# Patient Record
Sex: FEMALE | Race: OTHER | NOT HISPANIC OR LATINO | URBAN - METROPOLITAN AREA
[De-identification: names, ages, dates, MRNs, and addresses within clinical notes are randomized per-mention and may not be internally consistent; named-entity substitution may affect disease eponyms.]

---

## 2019-02-13 PROBLEM — Z00.00 ENCOUNTER FOR PREVENTIVE HEALTH EXAMINATION: Status: ACTIVE | Noted: 2019-02-13

## 2019-02-14 ENCOUNTER — OUTPATIENT (OUTPATIENT)
Dept: OUTPATIENT SERVICES | Facility: HOSPITAL | Age: 44
LOS: 1 days | End: 2019-02-14
Payer: COMMERCIAL

## 2019-02-14 ENCOUNTER — APPOINTMENT (OUTPATIENT)
Dept: CT IMAGING | Facility: IMAGING CENTER | Age: 44
End: 2019-02-14
Payer: COMMERCIAL

## 2019-02-14 DIAGNOSIS — Z00.8 ENCOUNTER FOR OTHER GENERAL EXAMINATION: ICD-10-CM

## 2019-02-14 PROCEDURE — 74174 CTA ABD&PLVS W/CONTRAST: CPT

## 2019-02-14 PROCEDURE — 74174 CTA ABD&PLVS W/CONTRAST: CPT | Mod: 26

## 2019-02-21 ENCOUNTER — OUTPATIENT (OUTPATIENT)
Dept: OUTPATIENT SERVICES | Facility: HOSPITAL | Age: 44
LOS: 1 days | End: 2019-02-21
Payer: COMMERCIAL

## 2019-02-21 ENCOUNTER — RESULT REVIEW (OUTPATIENT)
Age: 44
End: 2019-02-21

## 2019-02-21 DIAGNOSIS — Z90.13 ACQUIRED ABSENCE OF BILATERAL BREASTS AND NIPPLES: ICD-10-CM

## 2019-02-21 DIAGNOSIS — C50.212 MALIGNANT NEOPLASM OF UPPER-INNER QUADRANT OF LEFT FEMALE BREAST: ICD-10-CM

## 2019-02-21 PROCEDURE — 88321 CONSLTJ&REPRT SLD PREP ELSWR: CPT

## 2019-02-22 LAB — SURGICAL PATHOLOGY STUDY: SIGNIFICANT CHANGE UP

## 2019-02-28 ENCOUNTER — OUTPATIENT (OUTPATIENT)
Dept: OUTPATIENT SERVICES | Facility: HOSPITAL | Age: 44
LOS: 1 days | End: 2019-02-28
Payer: COMMERCIAL

## 2019-02-28 VITALS
RESPIRATION RATE: 18 BRPM | HEIGHT: 62.5 IN | TEMPERATURE: 99 F | SYSTOLIC BLOOD PRESSURE: 118 MMHG | WEIGHT: 133.16 LBS | HEART RATE: 94 BPM | OXYGEN SATURATION: 99 % | DIASTOLIC BLOOD PRESSURE: 78 MMHG

## 2019-02-28 VITALS — HEIGHT: 62.5 IN | WEIGHT: 133.16 LBS

## 2019-02-28 DIAGNOSIS — Z01.818 ENCOUNTER FOR OTHER PREPROCEDURAL EXAMINATION: ICD-10-CM

## 2019-02-28 DIAGNOSIS — Z90.13 ACQUIRED ABSENCE OF BILATERAL BREASTS AND NIPPLES: ICD-10-CM

## 2019-02-28 DIAGNOSIS — C50.912 MALIGNANT NEOPLASM OF UNSPECIFIED SITE OF LEFT FEMALE BREAST: ICD-10-CM

## 2019-02-28 DIAGNOSIS — C50.212 MALIGNANT NEOPLASM OF UPPER-INNER QUADRANT OF LEFT FEMALE BREAST: ICD-10-CM

## 2019-02-28 LAB
BLD GP AB SCN SERPL QL: SIGNIFICANT CHANGE UP
HCT VFR BLD CALC: 35.1 % — SIGNIFICANT CHANGE UP (ref 34.5–45)
HGB BLD-MCNC: 10.6 G/DL — LOW (ref 11.5–15.5)
MCHC RBC-ENTMCNC: 19.2 PG — LOW (ref 27–34)
MCHC RBC-ENTMCNC: 30.2 GM/DL — LOW (ref 32–36)
MCV RBC AUTO: 63.5 FL — LOW (ref 80–100)
PLATELET # BLD AUTO: 324 K/UL — SIGNIFICANT CHANGE UP (ref 150–400)
RBC # BLD: 5.53 M/UL — HIGH (ref 3.8–5.2)
RBC # FLD: 13.1 % — SIGNIFICANT CHANGE UP (ref 10.3–14.5)
WBC # BLD: 10.4 K/UL — SIGNIFICANT CHANGE UP (ref 3.8–10.5)
WBC # FLD AUTO: 10.4 K/UL — SIGNIFICANT CHANGE UP (ref 3.8–10.5)

## 2019-02-28 PROCEDURE — 86900 BLOOD TYPING SEROLOGIC ABO: CPT

## 2019-02-28 PROCEDURE — 86850 RBC ANTIBODY SCREEN: CPT

## 2019-02-28 PROCEDURE — 86901 BLOOD TYPING SEROLOGIC RH(D): CPT

## 2019-02-28 PROCEDURE — 85027 COMPLETE CBC AUTOMATED: CPT

## 2019-02-28 PROCEDURE — 36415 COLL VENOUS BLD VENIPUNCTURE: CPT

## 2019-02-28 PROCEDURE — G0463: CPT

## 2019-02-28 NOTE — H&P PST ADULT - NSANTHOSAYNRD_GEN_A_CORE
No. DAIANA screening performed.  STOP BANG Legend: 0-2 = LOW Risk; 3-4 = INTERMEDIATE Risk; 5-8 = HIGH Risk

## 2019-02-28 NOTE — H&P PST ADULT - ATTENDING COMMENTS
The patient is a 44 year old female who was recently diagnosed with left 11:00 invasive ductal breast cancer.  She presents to undergo bilateral nipple areolar sparing total mastectomies, bilateral axillary sentinel lymph node biopsies, possible bilateral axillary lymph node dissections and reconstruction.

## 2019-02-28 NOTE — H&P PST ADULT - PROBLEM SELECTOR PLAN 1
Bilateral Total Mastectomy, Left Axillary [place] Lymph Node Biopsy, Possible Left Axillary Lymph Node Dissection, Bilateral Christine Flap scheduled for 3/14/2019.   Pre-op instructions given. Pt verbalized understanding  Pepcid given for GI prophylaxis  Chlorhexidine wash instructions given  UCG ordered STAT for day of procedure   Pending: Medical clearance - requested by surgeon

## 2019-02-28 NOTE — H&P PST ADULT - HISTORY OF PRESENT ILLNESS
43yo female denies significant medical history reports annual mammogram done 1/2019 showed left breast abnormality and pt reports Breast US and biospy confirmed left breast acnae 43yo female denies significant medical history reports annual mammogram done 1/2019 showed left breast abnormality. Pt reports Breast US and biopsy confirmed left breast cancer. Pt presents today for presurgical testing for Bilateral Total Mastectomy, Left Axillary [place] Lymph Node Biopsy, Possible Left Axillary Lymph Node Dissection, Bilateral Christine Flap scheduled for 3/14/2019.

## 2019-02-28 NOTE — H&P PST ADULT - FAMILY HISTORY
Grandparent  Still living? Unknown  Family history of breast cancer in first degree relative, Age at diagnosis: Age Unknown     Father  Still living? Yes, Estimated age: Age Unknown  Family history of prostate cancer in father, Age at diagnosis: Age Unknown     Sibling  Still living? No  Family history of cancer in sister, Age at diagnosis: Age Unknown

## 2019-02-28 NOTE — H&P PST ADULT - MUSCULOSKELETAL
negative detailed exam normal strength/no joint swelling/no joint erythema/ROM intact/no joint warmth/no calf tenderness

## 2019-02-28 NOTE — H&P PST ADULT - NEGATIVE BREAST SYMPTOMS
no breast tenderness R/no nipple discharge L/no breast tenderness L/no nipple discharge R/no breast lump R

## 2019-03-13 ENCOUNTER — TRANSCRIPTION ENCOUNTER (OUTPATIENT)
Age: 44
End: 2019-03-13

## 2019-03-14 ENCOUNTER — RESULT REVIEW (OUTPATIENT)
Age: 44
End: 2019-03-14

## 2019-03-14 ENCOUNTER — INPATIENT (INPATIENT)
Facility: HOSPITAL | Age: 44
LOS: 2 days | Discharge: HOME | DRG: 581 | End: 2019-03-17
Attending: INTERNAL MEDICINE | Admitting: SURGERY
Payer: COMMERCIAL

## 2019-03-14 VITALS
OXYGEN SATURATION: 100 % | SYSTOLIC BLOOD PRESSURE: 109 MMHG | RESPIRATION RATE: 14 BRPM | HEIGHT: 62.5 IN | HEART RATE: 84 BPM | DIASTOLIC BLOOD PRESSURE: 77 MMHG | TEMPERATURE: 98 F | WEIGHT: 133.16 LBS

## 2019-03-14 DIAGNOSIS — C50.212 MALIGNANT NEOPLASM OF UPPER-INNER QUADRANT OF LEFT FEMALE BREAST: ICD-10-CM

## 2019-03-14 LAB
ABO RH CONFIRMATION: SIGNIFICANT CHANGE UP
ANION GAP SERPL CALC-SCNC: 14 MMOL/L — SIGNIFICANT CHANGE UP (ref 5–17)
BUN SERPL-MCNC: 12 MG/DL — SIGNIFICANT CHANGE UP (ref 7–23)
CALCIUM SERPL-MCNC: 8.3 MG/DL — LOW (ref 8.4–10.5)
CHLORIDE SERPL-SCNC: 104 MMOL/L — SIGNIFICANT CHANGE UP (ref 96–108)
CO2 SERPL-SCNC: 20 MMOL/L — LOW (ref 22–31)
CREAT SERPL-MCNC: 1.02 MG/DL — SIGNIFICANT CHANGE UP (ref 0.5–1.3)
GLUCOSE SERPL-MCNC: 151 MG/DL — HIGH (ref 70–99)
HCT VFR BLD CALC: 31.7 % — LOW (ref 34.5–45)
HGB BLD-MCNC: 9.7 G/DL — LOW (ref 11.5–15.5)
MCHC RBC-ENTMCNC: 19.2 PG — LOW (ref 27–34)
MCHC RBC-ENTMCNC: 30.5 GM/DL — LOW (ref 32–36)
MCV RBC AUTO: 63 FL — LOW (ref 80–100)
PLATELET # BLD AUTO: 287 K/UL — SIGNIFICANT CHANGE UP (ref 150–400)
POTASSIUM SERPL-MCNC: 3.9 MMOL/L — SIGNIFICANT CHANGE UP (ref 3.5–5.3)
POTASSIUM SERPL-SCNC: 3.9 MMOL/L — SIGNIFICANT CHANGE UP (ref 3.5–5.3)
RBC # BLD: 5.03 M/UL — SIGNIFICANT CHANGE UP (ref 3.8–5.2)
RBC # FLD: 12.9 % — SIGNIFICANT CHANGE UP (ref 10.3–14.5)
SODIUM SERPL-SCNC: 138 MMOL/L — SIGNIFICANT CHANGE UP (ref 135–145)
WBC # BLD: 16.8 K/UL — HIGH (ref 3.8–10.5)
WBC # FLD AUTO: 16.8 K/UL — HIGH (ref 3.8–10.5)

## 2019-03-14 PROCEDURE — 88305 TISSUE EXAM BY PATHOLOGIST: CPT | Mod: 26

## 2019-03-14 PROCEDURE — 88302 TISSUE EXAM BY PATHOLOGIST: CPT | Mod: 26

## 2019-03-14 PROCEDURE — 88333 PATH CONSLTJ SURG CYTO XM 1: CPT | Mod: 26

## 2019-03-14 PROCEDURE — 88307 TISSUE EXAM BY PATHOLOGIST: CPT | Mod: 26

## 2019-03-14 PROCEDURE — ZZZZZ: CPT

## 2019-03-14 RX ORDER — ENOXAPARIN SODIUM 100 MG/ML
40 INJECTION SUBCUTANEOUS EVERY 24 HOURS
Qty: 0 | Refills: 0 | Status: DISCONTINUED | OUTPATIENT
Start: 2019-03-14 | End: 2019-03-17

## 2019-03-14 RX ORDER — IBUPROFEN 200 MG
600 TABLET ORAL EVERY 8 HOURS
Qty: 0 | Refills: 0 | Status: DISCONTINUED | OUTPATIENT
Start: 2019-03-14 | End: 2019-03-17

## 2019-03-14 RX ORDER — CEFAZOLIN SODIUM 1 G
2000 VIAL (EA) INJECTION EVERY 8 HOURS
Qty: 0 | Refills: 0 | Status: COMPLETED | OUTPATIENT
Start: 2019-03-14 | End: 2019-03-15

## 2019-03-14 RX ORDER — ACETAMINOPHEN 500 MG
1000 TABLET ORAL ONCE
Qty: 0 | Refills: 0 | Status: DISCONTINUED | OUTPATIENT
Start: 2019-03-14 | End: 2019-03-14

## 2019-03-14 RX ORDER — KETOROLAC TROMETHAMINE 30 MG/ML
15 SYRINGE (ML) INJECTION EVERY 6 HOURS
Qty: 0 | Refills: 0 | Status: DISCONTINUED | OUTPATIENT
Start: 2019-03-14 | End: 2019-03-15

## 2019-03-14 RX ORDER — HYDROMORPHONE HYDROCHLORIDE 2 MG/ML
0.5 INJECTION INTRAMUSCULAR; INTRAVENOUS; SUBCUTANEOUS EVERY 4 HOURS
Qty: 0 | Refills: 0 | Status: DISCONTINUED | OUTPATIENT
Start: 2019-03-14 | End: 2019-03-17

## 2019-03-14 RX ORDER — ACETAMINOPHEN 500 MG
1000 TABLET ORAL ONCE
Qty: 0 | Refills: 0 | Status: COMPLETED | OUTPATIENT
Start: 2019-03-15 | End: 2019-03-15

## 2019-03-14 RX ORDER — ONDANSETRON 8 MG/1
4 TABLET, FILM COATED ORAL ONCE
Qty: 0 | Refills: 0 | Status: DISCONTINUED | OUTPATIENT
Start: 2019-03-14 | End: 2019-03-14

## 2019-03-14 RX ORDER — ACETAMINOPHEN 500 MG
650 TABLET ORAL EVERY 8 HOURS
Qty: 0 | Refills: 0 | Status: DISCONTINUED | OUTPATIENT
Start: 2019-03-15 | End: 2019-03-17

## 2019-03-14 RX ORDER — OXYCODONE HYDROCHLORIDE 5 MG/1
10 TABLET ORAL EVERY 4 HOURS
Qty: 0 | Refills: 0 | Status: DISCONTINUED | OUTPATIENT
Start: 2019-03-14 | End: 2019-03-17

## 2019-03-14 RX ORDER — DOCUSATE SODIUM 100 MG
100 CAPSULE ORAL THREE TIMES A DAY
Qty: 0 | Refills: 0 | Status: DISCONTINUED | OUTPATIENT
Start: 2019-03-14 | End: 2019-03-17

## 2019-03-14 RX ORDER — ACETAMINOPHEN 500 MG
1000 TABLET ORAL ONCE
Qty: 0 | Refills: 0 | Status: COMPLETED | OUTPATIENT
Start: 2019-03-14 | End: 2019-03-14

## 2019-03-14 RX ORDER — HYDROMORPHONE HYDROCHLORIDE 2 MG/ML
0.5 INJECTION INTRAMUSCULAR; INTRAVENOUS; SUBCUTANEOUS
Qty: 0 | Refills: 0 | Status: DISCONTINUED | OUTPATIENT
Start: 2019-03-14 | End: 2019-03-14

## 2019-03-14 RX ORDER — SODIUM CHLORIDE 9 MG/ML
1000 INJECTION, SOLUTION INTRAVENOUS
Qty: 0 | Refills: 0 | Status: DISCONTINUED | OUTPATIENT
Start: 2019-03-14 | End: 2019-03-16

## 2019-03-14 RX ORDER — SODIUM CHLORIDE 9 MG/ML
1000 INJECTION, SOLUTION INTRAVENOUS
Qty: 0 | Refills: 0 | Status: DISCONTINUED | OUTPATIENT
Start: 2019-03-14 | End: 2019-03-14

## 2019-03-14 RX ORDER — OXYCODONE HYDROCHLORIDE 5 MG/1
5 TABLET ORAL EVERY 4 HOURS
Qty: 0 | Refills: 0 | Status: DISCONTINUED | OUTPATIENT
Start: 2019-03-14 | End: 2019-03-17

## 2019-03-14 RX ADMIN — Medication 15 MILLIGRAM(S): at 20:51

## 2019-03-14 RX ADMIN — Medication 400 MILLIGRAM(S): at 22:23

## 2019-03-14 RX ADMIN — Medication 15 MILLIGRAM(S): at 20:18

## 2019-03-14 RX ADMIN — SODIUM CHLORIDE 125 MILLILITER(S): 9 INJECTION, SOLUTION INTRAVENOUS at 21:03

## 2019-03-14 RX ADMIN — Medication 1000 MILLIGRAM(S): at 22:57

## 2019-03-14 RX ADMIN — Medication 100 MILLIGRAM(S): at 21:45

## 2019-03-14 NOTE — BRIEF OPERATIVE NOTE - NSICDXBRIEFPROCEDURE_GEN_ALL_CORE_FT
PROCEDURES:  Breast reconstruction with DERRICK free flap 14-Mar-2019 15:28:00  Estrellita Rondon
PROCEDURES:  Bilateral mastectomy with sentinel node biopsy 14-Mar-2019 12:08:38  Palleschi, Susan M

## 2019-03-14 NOTE — BRIEF OPERATIVE NOTE - SPECIMENS
umbilical hernia sac
right axillary sentinel lymph nodes x 3, left axillary sentinel lymph nodes x 2; right breast, right subareolar breast tissue, left breast, left subareolar breast tissue

## 2019-03-14 NOTE — BRIEF OPERATIVE NOTE - NSICDXBRIEFPREOP_GEN_ALL_CORE_FT
PRE-OP DIAGNOSIS:  Carcinoma of left breast upper inner quadrant 14-Mar-2019 12:09:37  Palleschi, Susan M
PRE-OP DIAGNOSIS:  Carcinoma of left breast upper inner quadrant 14-Mar-2019 12:09:37  Palleschi, Susan M

## 2019-03-14 NOTE — BRIEF OPERATIVE NOTE - NSICDXBRIEFPOSTOP_GEN_ALL_CORE_FT
POST-OP DIAGNOSIS:  Carcinoma of left breast upper inner quadrant 14-Mar-2019 12:10:30  Palleschi, Susan M
POST-OP DIAGNOSIS:  Carcinoma of left breast upper inner quadrant 14-Mar-2019 12:10:30  Palleschi, Susan M

## 2019-03-15 LAB
ANION GAP SERPL CALC-SCNC: 9 MMOL/L — SIGNIFICANT CHANGE UP (ref 5–17)
BUN SERPL-MCNC: 10 MG/DL — SIGNIFICANT CHANGE UP (ref 7–23)
CALCIUM SERPL-MCNC: 8.2 MG/DL — LOW (ref 8.4–10.5)
CHLORIDE SERPL-SCNC: 106 MMOL/L — SIGNIFICANT CHANGE UP (ref 96–108)
CO2 SERPL-SCNC: 26 MMOL/L — SIGNIFICANT CHANGE UP (ref 22–31)
CREAT SERPL-MCNC: 0.75 MG/DL — SIGNIFICANT CHANGE UP (ref 0.5–1.3)
GLUCOSE SERPL-MCNC: 103 MG/DL — HIGH (ref 70–99)
HCT VFR BLD CALC: 28.7 % — LOW (ref 34.5–45)
HGB BLD-MCNC: 8.7 G/DL — LOW (ref 11.5–15.5)
MCHC RBC-ENTMCNC: 19.1 PG — LOW (ref 27–34)
MCHC RBC-ENTMCNC: 30.4 GM/DL — LOW (ref 32–36)
MCV RBC AUTO: 62.6 FL — LOW (ref 80–100)
PLATELET # BLD AUTO: 242 K/UL — SIGNIFICANT CHANGE UP (ref 150–400)
POTASSIUM SERPL-MCNC: 3.6 MMOL/L — SIGNIFICANT CHANGE UP (ref 3.5–5.3)
POTASSIUM SERPL-SCNC: 3.6 MMOL/L — SIGNIFICANT CHANGE UP (ref 3.5–5.3)
RBC # BLD: 4.58 M/UL — SIGNIFICANT CHANGE UP (ref 3.8–5.2)
RBC # FLD: 12.3 % — SIGNIFICANT CHANGE UP (ref 10.3–14.5)
SODIUM SERPL-SCNC: 141 MMOL/L — SIGNIFICANT CHANGE UP (ref 135–145)
WBC # BLD: 9.5 K/UL — SIGNIFICANT CHANGE UP (ref 3.8–10.5)
WBC # FLD AUTO: 9.5 K/UL — SIGNIFICANT CHANGE UP (ref 3.8–10.5)

## 2019-03-15 RX ADMIN — Medication 600 MILLIGRAM(S): at 14:08

## 2019-03-15 RX ADMIN — Medication 600 MILLIGRAM(S): at 15:00

## 2019-03-15 RX ADMIN — Medication 100 MILLIGRAM(S): at 21:38

## 2019-03-15 RX ADMIN — Medication 15 MILLIGRAM(S): at 08:02

## 2019-03-15 RX ADMIN — Medication 100 MILLIGRAM(S): at 05:12

## 2019-03-15 RX ADMIN — OXYCODONE HYDROCHLORIDE 10 MILLIGRAM(S): 5 TABLET ORAL at 21:21

## 2019-03-15 RX ADMIN — Medication 650 MILLIGRAM(S): at 22:51

## 2019-03-15 RX ADMIN — Medication 1000 MILLIGRAM(S): at 06:17

## 2019-03-15 RX ADMIN — Medication 600 MILLIGRAM(S): at 21:38

## 2019-03-15 RX ADMIN — OXYCODONE HYDROCHLORIDE 5 MILLIGRAM(S): 5 TABLET ORAL at 12:24

## 2019-03-15 RX ADMIN — SODIUM CHLORIDE 125 MILLILITER(S): 9 INJECTION, SOLUTION INTRAVENOUS at 00:47

## 2019-03-15 RX ADMIN — Medication 100 MILLIGRAM(S): at 05:23

## 2019-03-15 RX ADMIN — Medication 650 MILLIGRAM(S): at 15:00

## 2019-03-15 RX ADMIN — Medication 15 MILLIGRAM(S): at 02:38

## 2019-03-15 RX ADMIN — Medication 650 MILLIGRAM(S): at 23:51

## 2019-03-15 RX ADMIN — ENOXAPARIN SODIUM 40 MILLIGRAM(S): 100 INJECTION SUBCUTANEOUS at 05:23

## 2019-03-15 RX ADMIN — OXYCODONE HYDROCHLORIDE 10 MILLIGRAM(S): 5 TABLET ORAL at 20:17

## 2019-03-15 RX ADMIN — Medication 100 MILLIGRAM(S): at 14:08

## 2019-03-15 RX ADMIN — Medication 15 MILLIGRAM(S): at 09:00

## 2019-03-15 RX ADMIN — Medication 650 MILLIGRAM(S): at 14:09

## 2019-03-15 RX ADMIN — OXYCODONE HYDROCHLORIDE 5 MILLIGRAM(S): 5 TABLET ORAL at 13:30

## 2019-03-15 RX ADMIN — Medication 15 MILLIGRAM(S): at 02:04

## 2019-03-15 RX ADMIN — Medication 400 MILLIGRAM(S): at 05:42

## 2019-03-15 NOTE — PROGRESS NOTE ADULT - SUBJECTIVE AND OBJECTIVE BOX
NAD  Afebrile, VSS  ViOptix 60's, 70's  Bilateral breast free flap viable, closures intact.  Abdomen flat, closure intact.  Drains serosang.  Hct - 28

## 2019-03-15 NOTE — PROGRESS NOTE ADULT - SUBJECTIVE AND OBJECTIVE BOX
POD #1    Pt. sitting OOB in chair.  Ruben po.  Taking toradol, tylenol and ibuprofen prn pain  AVSS    PE  s/p Bilateral n/a sparing mastectomies, DERRICK flaps  incisions C/D/I, nipple areolar complexes, skin and flaps viable  Abd inc C/D/I    Plan  as per plastics  OOB, ambulate  lopez to be d/c'd later today  Advance diet as ruben  continue JPs  pain med prn

## 2019-03-16 ENCOUNTER — TRANSCRIPTION ENCOUNTER (OUTPATIENT)
Age: 44
End: 2019-03-16

## 2019-03-16 RX ORDER — IBUPROFEN 200 MG
1 TABLET ORAL
Qty: 0 | Refills: 0 | COMMUNITY
Start: 2019-03-16

## 2019-03-16 RX ORDER — ACETAMINOPHEN 500 MG
2 TABLET ORAL
Qty: 0 | Refills: 0 | COMMUNITY
Start: 2019-03-16

## 2019-03-16 RX ADMIN — Medication 600 MILLIGRAM(S): at 06:28

## 2019-03-16 RX ADMIN — Medication 600 MILLIGRAM(S): at 22:00

## 2019-03-16 RX ADMIN — Medication 600 MILLIGRAM(S): at 21:02

## 2019-03-16 RX ADMIN — Medication 650 MILLIGRAM(S): at 06:34

## 2019-03-16 RX ADMIN — OXYCODONE HYDROCHLORIDE 10 MILLIGRAM(S): 5 TABLET ORAL at 16:54

## 2019-03-16 RX ADMIN — Medication 650 MILLIGRAM(S): at 13:28

## 2019-03-16 RX ADMIN — Medication 650 MILLIGRAM(S): at 22:00

## 2019-03-16 RX ADMIN — Medication 600 MILLIGRAM(S): at 01:17

## 2019-03-16 RX ADMIN — Medication 600 MILLIGRAM(S): at 05:28

## 2019-03-16 RX ADMIN — Medication 600 MILLIGRAM(S): at 13:28

## 2019-03-16 RX ADMIN — SODIUM CHLORIDE 50 MILLILITER(S): 9 INJECTION, SOLUTION INTRAVENOUS at 03:16

## 2019-03-16 RX ADMIN — Medication 100 MILLIGRAM(S): at 21:02

## 2019-03-16 RX ADMIN — Medication 100 MILLIGRAM(S): at 13:28

## 2019-03-16 RX ADMIN — Medication 650 MILLIGRAM(S): at 21:00

## 2019-03-16 RX ADMIN — Medication 100 MILLIGRAM(S): at 05:28

## 2019-03-16 RX ADMIN — Medication 650 MILLIGRAM(S): at 05:33

## 2019-03-16 RX ADMIN — Medication 650 MILLIGRAM(S): at 14:30

## 2019-03-16 RX ADMIN — Medication 600 MILLIGRAM(S): at 14:30

## 2019-03-16 RX ADMIN — ENOXAPARIN SODIUM 40 MILLIGRAM(S): 100 INJECTION SUBCUTANEOUS at 05:28

## 2019-03-16 RX ADMIN — OXYCODONE HYDROCHLORIDE 10 MILLIGRAM(S): 5 TABLET ORAL at 17:55

## 2019-03-16 NOTE — DISCHARGE NOTE PROVIDER - CARE PROVIDER_API CALL
Veronica Ford (PA-C)  Physician Assistant Services  [address] [place] [address]  Phone: [phone]  Fax: [phone]  Follow Up Time:     Palleschi, Susan M (MD)  Surgery  [address], [address]  [place] [address]  Phone: [phone]  Fax: [phone]  Follow Up Time:

## 2019-03-16 NOTE — PROGRESS NOTE ADULT - SUBJECTIVE AND OBJECTIVE BOX
VSS  Flaps Pink, Vioptix 70s, dc'ed today  abd flat  JPs serosang  P: Ambulate, dc cardiac telemetry, Heplock IVF, dc O2

## 2019-03-16 NOTE — DISCHARGE NOTE PROVIDER - NSDCCPCAREPLAN_GEN_ALL_CORE_FT
PRINCIPAL DISCHARGE DIAGNOSIS  Diagnosis: Breast cancer, female  Assessment and Plan of Treatment: Patient to have bilateral mastectomy and DERRICK Flap

## 2019-03-16 NOTE — DISCHARGE NOTE PROVIDER - CARE PROVIDERS DIRECT ADDRESSES
[email or web address]

## 2019-03-16 NOTE — DISCHARGE NOTE PROVIDER - NSDCCPTREATMENT_GEN_ALL_CORE_FT
PRINCIPAL PROCEDURE  Procedure: Bilateral complete mastectomy  Findings and Treatment: Breast ca      SECONDARY PROCEDURE  Procedure: DERRICK flap, free  Findings and Treatment:

## 2019-03-16 NOTE — DISCHARGE NOTE PROVIDER - HOSPITAL COURSE
Generally healthy 44 year old female presents with newly diagnosed Left Breast cancer for Bilateral Mastectomies/sentinel nodes/DERRICK .  Patient did well post op.  She is ambulating, voiding, tolerating a diet    and her pain is managed.  She has been post op instructions and will follow up with Dr. Ford on Thursday at 2 pm .

## 2019-03-17 ENCOUNTER — TRANSCRIPTION ENCOUNTER (OUTPATIENT)
Age: 44
End: 2019-03-17

## 2019-03-17 VITALS
DIASTOLIC BLOOD PRESSURE: 70 MMHG | TEMPERATURE: 99 F | SYSTOLIC BLOOD PRESSURE: 102 MMHG | OXYGEN SATURATION: 98 % | RESPIRATION RATE: 16 BRPM | HEART RATE: 94 BPM

## 2019-03-17 PROCEDURE — C1889: CPT

## 2019-03-17 PROCEDURE — 80048 BASIC METABOLIC PNL TOTAL CA: CPT

## 2019-03-17 PROCEDURE — 88333 PATH CONSLTJ SURG CYTO XM 1: CPT

## 2019-03-17 PROCEDURE — A9541: CPT

## 2019-03-17 PROCEDURE — 85027 COMPLETE CBC AUTOMATED: CPT

## 2019-03-17 PROCEDURE — 36415 COLL VENOUS BLD VENIPUNCTURE: CPT

## 2019-03-17 PROCEDURE — 88305 TISSUE EXAM BY PATHOLOGIST: CPT

## 2019-03-17 PROCEDURE — 88307 TISSUE EXAM BY PATHOLOGIST: CPT

## 2019-03-17 PROCEDURE — 88302 TISSUE EXAM BY PATHOLOGIST: CPT

## 2019-03-17 RX ADMIN — OXYCODONE HYDROCHLORIDE 10 MILLIGRAM(S): 5 TABLET ORAL at 02:02

## 2019-03-17 RX ADMIN — OXYCODONE HYDROCHLORIDE 10 MILLIGRAM(S): 5 TABLET ORAL at 01:15

## 2019-03-17 RX ADMIN — Medication 100 MILLIGRAM(S): at 08:08

## 2019-03-17 RX ADMIN — Medication 650 MILLIGRAM(S): at 06:52

## 2019-03-17 RX ADMIN — Medication 650 MILLIGRAM(S): at 07:00

## 2019-03-17 RX ADMIN — ENOXAPARIN SODIUM 40 MILLIGRAM(S): 100 INJECTION SUBCUTANEOUS at 06:51

## 2019-03-17 RX ADMIN — Medication 600 MILLIGRAM(S): at 06:52

## 2019-03-17 RX ADMIN — Medication 600 MILLIGRAM(S): at 07:00

## 2019-03-17 NOTE — PROGRESS NOTE ADULT - SUBJECTIVE AND OBJECTIVE BOX
DERRICK flaps pink  abd flat  pt ready to go home  has Rx at pharmacy  shower daily  f/u Thursday in office

## 2019-03-17 NOTE — DISCHARGE NOTE NURSING/CASE MANAGEMENT/SOCIAL WORK - NSDCDPATPORTLINK_GEN_ALL_CORE
You can access the [facility] Portal, offered by [facility], by registering with the following website: [email or web address]

## 2019-03-18 LAB — SURGICAL PATHOLOGY STUDY: SIGNIFICANT CHANGE UP

## 2019-03-28 PROBLEM — C50.919 MALIGNANT NEOPLASM OF UNSPECIFIED SITE OF UNSPECIFIED FEMALE BREAST: Chronic | Status: ACTIVE | Noted: 2019-03-14

## 2019-03-28 PROBLEM — D56.3 THALASSEMIA MINOR: Chronic | Status: ACTIVE | Noted: 2019-03-14

## 2019-04-09 ENCOUNTER — OUTPATIENT (OUTPATIENT)
Dept: OUTPATIENT SERVICES | Facility: HOSPITAL | Age: 44
LOS: 1 days | Discharge: HOME | End: 2019-04-09

## 2019-04-09 DIAGNOSIS — C50.919 MALIGNANT NEOPLASM OF UNSPECIFIED SITE OF UNSPECIFIED FEMALE BREAST: ICD-10-CM

## 2019-04-15 ENCOUNTER — OUTPATIENT (OUTPATIENT)
Dept: OUTPATIENT SERVICES | Facility: HOSPITAL | Age: 44
LOS: 1 days | End: 2019-04-15
Payer: COMMERCIAL

## 2019-04-15 ENCOUNTER — APPOINTMENT (OUTPATIENT)
Dept: HEMATOLOGY ONCOLOGY | Facility: CLINIC | Age: 44
End: 2019-04-15
Payer: COMMERCIAL

## 2019-04-15 ENCOUNTER — RESULT REVIEW (OUTPATIENT)
Age: 44
End: 2019-04-15

## 2019-04-15 VITALS
TEMPERATURE: 97.8 F | DIASTOLIC BLOOD PRESSURE: 76 MMHG | BODY MASS INDEX: 23.83 KG/M2 | WEIGHT: 134.48 LBS | HEIGHT: 63 IN | RESPIRATION RATE: 16 BRPM | HEART RATE: 81 BPM | OXYGEN SATURATION: 100 % | SYSTOLIC BLOOD PRESSURE: 108 MMHG

## 2019-04-15 DIAGNOSIS — Z80.43 FAMILY HISTORY OF MALIGNANT NEOPLASM OF TESTIS: ICD-10-CM

## 2019-04-15 DIAGNOSIS — Z80.42 FAMILY HISTORY OF MALIGNANT NEOPLASM OF PROSTATE: ICD-10-CM

## 2019-04-15 DIAGNOSIS — D56.3 THALASSEMIA MINOR: ICD-10-CM

## 2019-04-15 DIAGNOSIS — D03.59 MELANOMA IN SITU OF OTHER PART OF TRUNK: ICD-10-CM

## 2019-04-15 DIAGNOSIS — Z80.3 FAMILY HISTORY OF MALIGNANT NEOPLASM OF BREAST: ICD-10-CM

## 2019-04-15 DIAGNOSIS — C50.912 MALIGNANT NEOPLASM OF UNSPECIFIED SITE OF LEFT FEMALE BREAST: ICD-10-CM

## 2019-04-15 DIAGNOSIS — Z80.0 FAMILY HISTORY OF MALIGNANT NEOPLASM OF DIGESTIVE ORGANS: ICD-10-CM

## 2019-04-15 DIAGNOSIS — Z78.9 OTHER SPECIFIED HEALTH STATUS: ICD-10-CM

## 2019-04-15 DIAGNOSIS — D68.9 COAGULATION DEFECT, UNSPECIFIED: ICD-10-CM

## 2019-04-15 DIAGNOSIS — C50.919 MALIGNANT NEOPLASM OF UNSPECIFIED SITE OF UNSPECIFIED FEMALE BREAST: ICD-10-CM

## 2019-04-15 LAB
ALBUMIN SERPL ELPH-MCNC: 4.5 G/DL
ALP BLD-CCNC: 59 U/L
ALT SERPL-CCNC: 12 U/L
ANION GAP SERPL CALC-SCNC: 13 MMOL/L
APTT BLD: 28.4 SEC
AST SERPL-CCNC: 12 U/L
BILIRUB SERPL-MCNC: 0.4 MG/DL
BUN SERPL-MCNC: 15 MG/DL
CALCIUM SERPL-MCNC: 9.8 MG/DL
CHLORIDE SERPL-SCNC: 103 MMOL/L
CO2 SERPL-SCNC: 25 MMOL/L
CREAT SERPL-MCNC: 0.63 MG/DL
GLUCOSE SERPL-MCNC: 84 MG/DL
HCT VFR BLD CALC: 32.7 % — LOW (ref 34.5–45)
HCYS SERPL-MCNC: 9.1 UMOL/L
HGB BLD-MCNC: 11.1 G/DL — LOW (ref 11.5–15.5)
INR PPP: 1.11 RATIO
MCHC RBC-ENTMCNC: 19.7 PG — LOW (ref 27–34)
MCHC RBC-ENTMCNC: 33.9 G/DL — SIGNIFICANT CHANGE UP (ref 32–36)
MCV RBC AUTO: 58 FL — LOW (ref 80–100)
PLATELET # BLD AUTO: 265 K/UL — SIGNIFICANT CHANGE UP (ref 150–400)
POTASSIUM SERPL-SCNC: 4.4 MMOL/L
PROT SERPL-MCNC: 7.1 G/DL
PT BLD: 12.7 SEC
RBC # BLD: 5.63 M/UL — HIGH (ref 3.8–5.2)
RBC # FLD: 14.1 % — SIGNIFICANT CHANGE UP (ref 10.3–14.5)
SODIUM SERPL-SCNC: 141 MMOL/L
WBC # BLD: 9.4 K/UL — SIGNIFICANT CHANGE UP (ref 3.8–10.5)
WBC # FLD AUTO: 9.4 K/UL — SIGNIFICANT CHANGE UP (ref 3.8–10.5)

## 2019-04-15 PROCEDURE — 81240 F2 GENE: CPT

## 2019-04-15 PROCEDURE — 99245 OFF/OP CONSLTJ NEW/EST HI 55: CPT

## 2019-04-15 PROCEDURE — G0452: CPT | Mod: 26

## 2019-04-15 RX ORDER — ACETAMINOPHEN 500 MG/1
500 TABLET, COATED ORAL
Refills: 0 | Status: ACTIVE | COMMUNITY
Start: 2019-04-15

## 2019-04-15 RX ORDER — PNV NO.95/FERROUS FUM/FOLIC AC 28MG-0.8MG
TABLET ORAL
Refills: 0 | Status: ACTIVE | COMMUNITY
Start: 2019-04-15

## 2019-04-15 RX ORDER — IBUPROFEN 200 MG/1
200 TABLET, COATED ORAL EVERY 8 HOURS
Refills: 0 | Status: ACTIVE | COMMUNITY
Start: 2019-04-15

## 2019-04-16 PROBLEM — D56.3 THALASSEMIA MINOR: Status: ACTIVE | Noted: 2019-04-16

## 2019-04-16 NOTE — REVIEW OF SYSTEMS
[Patient Intake Form Reviewed] : Patient intake form was reviewed [FreeTextEntry2] : Had flu vaccination this season [FreeTextEntry7] : Never had colonoscopy [FreeTextEntry8] : Most recent GYN exam 6/2018 [FreeTextEntry9] : Never had bone densitometry [de-identified] : Sees dermatologist every 3 months

## 2019-04-16 NOTE — PHYSICAL EXAM
[Fully active, able to carry on all pre-disease performance without restriction] : Status 0 - Fully active, able to carry on all pre-disease performance without restriction [Normal] : affect appropriate [de-identified] : Bilateral nipple sparing mastectomies with DERRICK flap reconstructions. Eschar around superior margins of both areolae. Bilateral well-healed axillary scars. Minimal right axillary seroma. [de-identified] : Transverse lower abdominal incision from flap surgery.

## 2019-04-16 NOTE — HISTORY OF PRESENT ILLNESS
[T: ___] : T[unfilled] [Disease: _____________________] : Disease: [unfilled] [N: ___] : N[unfilled] [AJCC Stage: ____] : AJCC Stage: [unfilled] [M: ___] : M[unfilled] [de-identified] : The patient presented in 2018, at age 43, with an abnormal screening mammogram.\par \par The patient went for routine screening mammogram on 2018 which demonstrated bilateral breast asymmetry.\par \par Call back mammogram/breast ultrasound performed on 2019 demonstrated a persistent left breast asymmetry associated with a 2 cm spiculated mass. The presence of the mass  measuring 2.1 cm was confirmed on ultrasound.\par \par Ultrasound-guided biopsy of the left breast mass was performed on 2019 and was positive for moderately differentiated infiltrating ductal carcinoma,ER positive (%), OR positive (%), HER-2/sohail negative (zero), and Ki-67 25%.\par \par Breast MRI performed on 2019 demonstrated 6.5 cm  non-mass enhancement in the left breast for which MRI guided biopsy was recommended.\par \par The patient  saw Dr. Susan Palleschi on 2019 and opted for bilateral mastectomies.\par \par Dr. Susan Palleschi and Dr. Scottie Ford performed bilateral nipple sparing mastectomies/bilateral sentinel lymph node biopsies/bilateral DERRICK flap reconstructions on 3/14/2019. Pathology on the left breast confirmed the presence of infiltrating ductal carcinoma, Calamus score 6/9, measuring 1.5 cm. It was interpreted as being suspicious for lymphovascular invasion. There was no DCIS within the specimen. There were 2 left sentinel lymph nodes in the specimen, both negative for metastatic disease. Oncotype DX recurrence score was 8. Pathology from the right breast and right sentinel lymph nodes was benign.\par \par She had an uneventful post operative course.\par \par Risk factors:\par  Prior breast disease: None, no biopsies. Menarche: Age 12. The patient is premenopausal with her LMP on 2019 and her  one month before that. She is  one para 1001 with childbirth at age 29. She breast-fed her child for less than one month. Oral contraceptive use: Approximately 3 months in her early 20s. Other hormone exposure: None. Topical estrogens: None. IUD: None. Family history is positive for a paternal grandmother with unilateral breast cancer at age 81. There is no family history of ovarian or endometrial cancer. The patient's father developed prostate cancer at age 70 and is living at age 83. A paternal half-brother developed testicular cancer at age 18 and a paternal half sister developed esophageal cancer at age 44 and  of brain metastases at age 45. There is no family history of colorectal neoplasia. The patient states that she had a "early melanoma" in 2016 treated with Mohs surgery. The patient is an Serbian ethnic background on the maternal side and Ashkenazi Shinto ([place]ish) on the paternal side. The patient states Dr Palleschi performed genetic testing which was negative. [de-identified] : Moderately differentiated infiltrating ductal carcinoma, ER positive, RI positive, HER-2/sohail negative, Oncotype DX recurrence score 8

## 2019-04-16 NOTE — REASON FOR VISIT
[Initial Consultation] : an initial consultation [Other: _____] : [unfilled] [FreeTextEntry2] : Infiltrating ductal carcinoma left breast

## 2019-04-16 NOTE — CONSULT LETTER
[Dear  ___] : Dear  [unfilled], [Courtesy Letter:] : I had the pleasure of seeing your patient, [unfilled], in my office today. [Please see my note below.] : Please see my note below. [Sincerely,] : Sincerely, [DrLeilani  ___] : Dr. MUNIZ [DrLeilani ___] : Dr. MUNIZ [FreeTextEntry3] : More Da Silva MD\par

## 2019-04-17 LAB
APCR PPP: 2.76 RATIO
CARDIOLIPIN AB SER IA-ACNC: NEGATIVE
FACT VIII ACT/NOR PPP: 123 %
FVL BLANK: 36.9
FVL TEST: 101.9
PROT C PPP CHRO-ACNC: 113 %
PROT S AG ACT/NOR PPP IA: 74 %
PTR INTERPRETATION: SIGNIFICANT CHANGE UP

## 2019-10-02 ENCOUNTER — OUTPATIENT (OUTPATIENT)
Dept: OUTPATIENT SERVICES | Facility: HOSPITAL | Age: 44
LOS: 1 days | Discharge: HOME | End: 2019-10-02

## 2019-10-02 DIAGNOSIS — C50.919 MALIGNANT NEOPLASM OF UNSPECIFIED SITE OF UNSPECIFIED FEMALE BREAST: ICD-10-CM

## 2019-10-04 ENCOUNTER — APPOINTMENT (OUTPATIENT)
Dept: HEMATOLOGY ONCOLOGY | Facility: CLINIC | Age: 44
End: 2019-10-04

## 2024-11-15 NOTE — PROGRESS NOTE ADULT - PROVIDER SPECIALTY LIST ADULT
Plastic Surgery
Surgery
Rehabilitation services/Wound care and assessment